# Patient Record
Sex: FEMALE | Race: WHITE | NOT HISPANIC OR LATINO
[De-identification: names, ages, dates, MRNs, and addresses within clinical notes are randomized per-mention and may not be internally consistent; named-entity substitution may affect disease eponyms.]

---

## 2024-08-07 ENCOUNTER — APPOINTMENT (OUTPATIENT)
Age: 64
End: 2024-08-07

## 2024-08-07 ENCOUNTER — OUTPATIENT (OUTPATIENT)
Dept: OUTPATIENT SERVICES | Facility: HOSPITAL | Age: 64
LOS: 1 days | Discharge: HOME | End: 2024-08-07

## 2024-08-07 PROBLEM — Z00.00 ENCOUNTER FOR PREVENTIVE HEALTH EXAMINATION: Status: ACTIVE | Noted: 2024-08-07

## 2024-08-07 PROCEDURE — 99205 OFFICE O/P NEW HI 60 MIN: CPT | Mod: 95

## 2024-08-07 NOTE — DISCUSSION/SUMMARY
[Initial Plan] : Initial Plan [Cognitively intact] : cognitively intact [In good health] : in good health [FreeTextEntry3] : 8/7/24 [Mental Health] : Mental Health [Initial] : Initial [every ___ weeks] : every [unfilled] weeks [Yes] : Yes [Psychiatric Provider/Prescriber] : Psychiatric Provider/Prescriber [FreeTextEntry4] : I need support [de-identified] : pt will report 3 effective coping skills when feeling overwhelmed [de-identified] : 2mo [de-identified] : 2mo [de-identified] : pt will reports feeling 50% stress reduction each day [Date of Last Physical Exam: _____] : Date of Last Physical Exam: [unfilled] [Date of Last Annual Labs: _____] : Date of Last Annual Labs: [unfilled] [Low acute suicide risk] : Low acute suicide risk [No] : No [FreeTextEntry1] : despite distress, pt is at low acute suicide risk, adriano as she has sense of obligation to care for her son, she is help-seeking, motivated for tx.

## 2024-08-07 NOTE — PHYSICAL EXAM
[4] : 4 [Well groomed] : well groomed [Cooperative] : cooperative [Anxious] : anxious [Constricted] : constricted [Rapid] : rapid [Linear/Goal Directed] : linear/goal directed [None] : none [None Reported] : none reported [Average] : average [WNL] : within normal limits [de-identified] : not internally preoccupied
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
## 2024-08-07 NOTE — REVIEW OF SYSTEMS
[Negative] : Allergic/Immunologic [FreeTextEntry9] : chronic neck and shoulder pain associated with stress
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
## 2024-08-07 NOTE — REASON FOR VISIT
[Patient preference] : as per patient preference [Telehealth (audio & video) - Individual/Group] : This visit was provided via telehealth using real-time 2-way audio visual technology. [Other Location: e.g. Home (Enter Location, City,State)___] : The provider was located at [unfilled]. [Home] : The patient, [unfilled], was located at home, [unfilled], at the time of the visit. [Verbal consent obtained from patient/other participant(s)] : Verbal consent for telehealth/telephonic services obtained from patient/other participant(s) [Self-Referred] : Self-Referred [Patient] : Patient
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
## 2024-08-07 NOTE — PHYSICAL EXAM
[4] : 4 [Well groomed] : well groomed [Cooperative] : cooperative [Anxious] : anxious [Constricted] : constricted [Rapid] : rapid [Linear/Goal Directed] : linear/goal directed [None] : none [None Reported] : none reported [Average] : average [WNL] : within normal limits [de-identified] : not internally preoccupied

## 2024-08-08 DIAGNOSIS — F43.23 ADJUSTMENT DISORDER WITH MIXED ANXIETY AND DEPRESSED MOOD: ICD-10-CM

## 2024-10-30 ENCOUNTER — NON-APPOINTMENT (OUTPATIENT)
Age: 64
End: 2024-10-30

## 2024-10-30 DIAGNOSIS — F43.23 ADJUSTMENT DISORDER WITH MIXED ANXIETY AND DEPRESSED MOOD: ICD-10-CM
